# Patient Record
Sex: MALE | Race: WHITE | Employment: OTHER | URBAN - METROPOLITAN AREA
[De-identification: names, ages, dates, MRNs, and addresses within clinical notes are randomized per-mention and may not be internally consistent; named-entity substitution may affect disease eponyms.]

---

## 2018-10-17 ENCOUNTER — THERAPY VISIT (OUTPATIENT)
Dept: PHYSICAL THERAPY | Facility: CLINIC | Age: 63
End: 2018-10-17
Payer: COMMERCIAL

## 2018-10-17 DIAGNOSIS — M54.50 RIGHT-SIDED LOW BACK PAIN WITHOUT SCIATICA: Primary | ICD-10-CM

## 2018-10-17 DIAGNOSIS — M54.6 RIGHT-SIDED THORACIC BACK PAIN: ICD-10-CM

## 2018-10-17 PROCEDURE — G8979 MOBILITY GOAL STATUS: HCPCS | Mod: GP | Performed by: PHYSICAL THERAPIST

## 2018-10-17 PROCEDURE — 97161 PT EVAL LOW COMPLEX 20 MIN: CPT | Mod: GP | Performed by: PHYSICAL THERAPIST

## 2018-10-17 PROCEDURE — 97110 THERAPEUTIC EXERCISES: CPT | Mod: GP | Performed by: PHYSICAL THERAPIST

## 2018-10-17 PROCEDURE — G8978 MOBILITY CURRENT STATUS: HCPCS | Mod: GP | Performed by: PHYSICAL THERAPIST

## 2018-10-17 NOTE — MR AVS SNAPSHOT
After Visit Summary   10/17/2018    Eliel Mooney Jr.    MRN: [id number]           Patient Information     Date Of Birth          1955        Visit Information        Provider Department      10/17/2018 3:20 PM Love Sue, PT [facility]        Today's Diagnoses     Right-sided low back pain without sciatica    -  1    Right-sided thoracic back pain           Follow-ups after your visit        Your next 10 appointments already scheduled     Oct 24, 2018  3:20 PM CDT   [facility]   [facility] ([facility])    [address]  [place] [address]   [phone]            Oct 31, 2018  9:30 AM CDT   [facility]   [facility] ([facility]    [address]  [place] [address]   [phone]            Nov 07, 2018  9:30 AM CST   [facility]   [facility] ([facility])    [address]  [address]  [place] [address]   [phone]            Nov 14, 2018  9:30 AM CST   [facility]   [facility] ([facility]    [address]  [place] [address]   [phone]            Nov 19, 2018 10:00 AM CST   [facility]   [facility] ([facility]    [address]  [place] [address]   [phone]            Nov 28, 2018  9:30 AM CST   [facility]   [facility] ([facility] [place]    [address]  [address]  [place] [address]   [phone]              Who to contact     If you have questions or need follow up information about today's clinic visit or your schedule please contact [facility]  directly at [phone].  Normal or non-critical lab and imaging results will be communicated to you by MyChart, letter or phone within 4 business days after the clinic has received the results. If you do not hear from us within 7 days, please contact the clinic through MyChart or phone. If you have a critical or abnormal lab result, we will notify you by phone as soon as possible.  Submit refill requests through [employer] or call your pharmacy and they will forward the refill request to us. Please allow 3 business days for your refill to be completed.          Additional Information About Your Visit        Care EveryWhere ID     This is your Care EveryWhere ID. This could be used by other organizations to access your [place] medical records  [id number]         Blood Pressure from Last 3 Encounters:   03/08/13 136/82   12/05/12 142/88   06/04/12 118/72    Weight from Last 3 Encounters:   03/08/13 114.3 kg (252 lb)   12/05/12 114.8 kg (253 lb)   06/04/12 114.3 kg (252 lb)              We Performed the Following     [facility] Inital Eval Report     PT Eval, Low Complexity (86303)     Therapeutic Exercises        Primary Care Provider Office Phone # Fax #    [facility] [phone] [address] [place] [address]        Equal Access to Services     FAY JACKMAN : Hadii aad ku hadasho Soomaali, waaxda luqadaha, qaybta kaalmada adeegyada, reji rothman. So [facility] [phone].    ATENCIÓN: Si habla español, tiene a moore disposición servicios gratuitos de asistencia lingüística. Pkame al [phone].    We comply with applicable federal civil rights laws and [place] laws. We do not discriminate on the basis of race, color, national origin, age, disability, sex, sexual orientation, or gender identity.            Thank you!     Thank you for choosing [facility]  for your care. Our goal is always to provide you with  excellent care. Hearing back from our patients is one way we can continue to improve our services. Please take a few minutes to complete the written survey that you may receive in the mail after your visit with us. Thank you!             Your Updated Medication List - Protect others around you: Learn how to safely use, store and throw away your medicines at www.disposemymeds.org.          This list is accurate as of 10/17/18  4:35 PM.  Always use your most recent med list.                   Brand Name Dispense Instructions for use Diagnosis    ACE NOT PRESCRIBED (INTENTIONAL)           amitriptyline 10 MG tablet    ELAVIL    360 tablet    Take  by mouth At Bedtime. Four tablets at bedtime    Low back pain, Facet arthropathy, Idiopathic peripheral neuropathy, Stress, Physical deconditioning       aspirin 81 MG tablet      1 TABLET DAILY        blood glucose calibration solution     1 Bottle    1 Bottle by Test Solution route as needed.    DM type 2 (diabetes mellitus, type 2) (H)       blood glucose monitoring test strip    ONETOUCH ULTRA    180 strip    1 strip by Strip route 2 times daily.    DM type 2 (diabetes mellitus, type 2) (H)       Fenofibrate Micronized 134 MG Caps     90 capsule    Take 1 capsule by mouth daily. with food.    Hyperlipidemia LDL goal < 130       fish Oil 1200 MG capsule      daily        gabapentin 600 MG tablet    NEURONTIN    360 tablet    2 in the AM, 1 in the evening, 1 at Bedtime    Idiopathic peripheral neuropathy       * Ketoprofen Powd     180 g    3 times daily as needed. No more than a finger length full to tender areas    Low back pain, Facet arthropathy, Idiopathic peripheral neuropathy       * ketoprofen 10% in PLO 10% topical gel     100 g    Apply 1 Applicatorful topically every 4 hours as needed.    OA (osteoarthritis)       lidocaine 5 % Patch    LIDODERM    30 patch    Place 1 patch onto the skin every 24 hours.    Low back pain       losartan-hydrochlorothiazide 50-12.5  MG per tablet    HYZAAR    90 tablet    Take 1 tablet by mouth daily.    Hypertension goal BP (blood pressure) < 140/90       metFORMIN 500 MG tablet    GLUCOPHAGE    180 tablet    Take 1 tablet by mouth 2 times daily (with meals).    DM type 2 (diabetes mellitus, type 2) (H)       MULTI-DAY PO      daily        ONE TOUCH DELICA LANCETS Misc     100 each    1 Device 2 times daily.    DM type 2 (diabetes mellitus, type 2) (H)       * propranolol HCl 60 MG Cp24     90 capsule    Take 60 mg by mouth daily.    HTN (hypertension)       * propranolol 60 MG 24 hr capsule    INDERAL LA    90 capsule    Take 1 capsule by mouth daily.    HTN (hypertension)       simvastatin 20 MG tablet    ZOCOR    90 tablet    Take 1 tablet by mouth At Bedtime.    Hyperlipidemia LDL goal < 130       vitamin D 2000 units tablet      1 TABLET DAILY        VITAMIN-B COMPLEX PO      daily        * Notice:  This list has 4 medication(s) that are the same as other medications prescribed for you. Read the directions carefully, and ask your doctor or other care provider to review them with you.

## 2018-10-17 NOTE — PROGRESS NOTES
[facility] Initial Evaluation  Subjective:  Patient is a 63 year old male presenting with rehab back hpi. The history is provided by the patient. No  was used.   Eliel Mooney Jr. is a 63 year old male with a lumbar and thoracic condition.  Condition occurred with:  Insidious onset.  Condition occurred: for unknown reasons.  This is a new condition  Date of MD order for this episode was 10-15-18. Eliel has a hx of LBP(3 back surgeries, diabetic neuropathy, weakness B l/e's, foot drop L). He usually walks with a cane in the R hand. Since this exacerbation of pain has used 2 canes at home and needs help dressing. This exacerbation with unknown cause. Eliel states his back has felt pretty good since his last back surgery 2016.   Exercise-3x/wk, goes to gym-pool workouts, knee extension machine, stationary bike 10-15 min, upper body, wall squats, bridges-has been 3wks since been to the gym now. .    Patient reports pain:  Thoracic spine right and lumbar spine right.  Radiates to:  No radiation.  Pain is described as sharp, aching and stabbing and is constant and reported as 6/10.  Associated symptoms:  Loss of motion/stiffness. Pain is worse during the day.  Symptoms are exacerbated by bending, carrying, certain positions, lifting, walking, twisting and standing and relieved by ice, other and muscle relaxants (analgesic gel).  Since onset symptoms are unchanged.        General health as reported by patient is good.  Pertinent medical history includes:  Diabetes, high blood pressure, overweight and numbness/tingling.  Medical allergies: yes (niacin, lisinopril).  Other surgeries include:  Orthopedic surgery and other (backx3, gall bladder, L shoulder).  Current medications:  High blood pressure medication.  Current occupation is retired.    Primary job tasks include:  Other (yard work).    Barriers include:  None as reported by the patient.    Red flags:  Foot drop (has had  previous to this exacerbation of pain).                        Objective:  Standing Alignment:                  General deviations alignment: stands with trunk forward flexed.  Gait:  Foot drop L  Gait Type:  Antalgic   Assistive Devices:  Cane      Flexibility/Screens:       Lower Extremity:  Decreased left lower extremity flexibility:Piriformis; Quadriceps and Hamstrings    Decreased right lower extremity flexibility:  Piriformis; Quadriceps and Hamstrings               Lumbar/SI Evaluation  ROM:    AROM Lumbar:   Flexion:            25%  Ext:                    25%   Side Bend:        Left:  25%    Right:  25%  Rotation:           Left:  50%    Right:  50%  Side Glide:        Left:     Right:         Strength: able to activate TrA  Lumbar Myotomes:    T12-L3 (Hip Flex):  Left: 4    Right: 4  L2-4 (Quads):  Left:  5    Right:  4  L4 (Ankle DF):  Left:  0    Right:  3-  L5 (Great Toe Ext): Left: 0    Right: 3     Lumbar DTR's:  Lumbar dtr's: unable to elicit patellar B and L achilles, R achilles hypoactive.        Lumbar Dermtomes:  Lumbar dermatomes: sensation equal to light touch B l/e's.                Neural Tension/Mobility:      Left side:SLR  negative.     Right side:   SLR  negative.   Lumbar Palpation:      Tenderness not present at Left:    Piriformis or PSIS  Tenderness present at Right: Quadratus Lumborum and Erector Spinae  Tenderness not present at Right:  Piriformis or PSIS    Lumbar Provocation:      Left negative with:  PROM hip    Right negative with:  PROM hip  Spinal Segmental Conclusions: PA lumbar/lower thoracic spine stiff and not painful                                                       General     ROS    Assessment/Plan:    Patient is a 63 year old male with thoracic and lumbar complaints.    Patient has the following significant findings with corresponding treatment plan.                Diagnosis 1:  Thoracic lumbar pain R  Pain -  self management, education and home program  Decreased  ROM/flexibility - manual therapy, therapeutic exercise and home program  Decreased joint mobility - manual therapy, therapeutic exercise and home program  Decreased strength - therapeutic exercise, therapeutic activities and home program  Impaired balance - neuro re-education, therapeutic activities and home program  Decreased proprioception - neuro re-education, therapeutic activities and home program  Impaired gait - gait training and home program  Impaired muscle performance - neuro re-education and home program  Decreased function - therapeutic activities and home program  Impaired posture - neuro re-education and home program    Therapy Evaluation Codes:   1) History comprised of:   Personal factors that impact the plan of care:      Age and Past/current experiences.    Comorbidity factors that impact the plan of care are:      Diabetes and Overweight.     Medications impacting care: High blood pressure.  2) Examination of Body Systems comprised of:   Body structures and functions that impact the plan of care:      Lumbar spine and Thoracic Spine.   Activity limitations that impact the plan of care are:      Bathing, Bending, Cooking, Driving, Dressing, Lifting, Sitting, Squatting/kneeling, Stairs, Standing, Walking, Sleeping and Laying down.  3) Clinical presentation characteristics are:   Stable/Uncomplicated.  4) Decision-Making    Low complexity using standardized patient assessment instrument and/or measureable assessment of functional outcome.  Cumulative Therapy Evaluation is: Low complexity.    Previous and current functional limitations:  (See Goal Flow Sheet for this information)    Short term and Long term goals: (See Goal Flow Sheet for this information)     Communication ability:  Patient appears to be able to clearly communicate and understand verbal and written communication and follow directions correctly.  Treatment Explanation - The following has been discussed with the patient:   RX  ordered/plan of care  Anticipated outcomes  Possible risks and side effects  This patient would benefit from PT intervention to resume normal activities.   Rehab potential is good.    Frequency:  1 X week, once daily  Duration:  for 6 weeks  Discharge Plan:  Achieve all LTG.  Independent in home treatment program.  Reach maximal therapeutic benefit.    Please refer to the daily flowsheet for treatment today, total treatment time and time spent performing 1:1 timed codes.

## 2018-10-17 NOTE — LETTER
[facility] [place] [facility]  [address]  [address]  [place] [address]  [phone]    2018    Re: Eliel Mooney Jr.   :   1955  MRN:  [id number]   REFERRING PHYSICIAN:   Angela Rebolledo    [facility]    Date of Initial Evaluation: 10/17/18  Visits:  Rxs Used: 1  Reason for Referral:     Right-sided thoracic back pain  Right-sided low back pain without sciatica    EVALUATION SUMMARY    [facility] Initial Evaluation  Subjective:  Patient is a 63 year old male presenting with rehab back hpi. The history is provided by the patient. No  was used.   Eliel Mooney Jr. is a 63 year old male with a lumbar and thoracic condition.  Condition occurred with:  Insidious onset.  Condition occurred: for unknown reasons.  This is a new condition  Date of MD order for this episode was 10-15-18. Eliel has a hx of LBP(3 back surgeries, diabetic neuropathy, weakness B l/e's, foot drop L). He usually walks with a cane in the R hand. Since this exacerbation of pain has used 2 canes at home and needs help dressing. This exacerbation with unknown cause. Eliel states his back has felt pretty good since his last back surgery .   Exercise-3x/wk, goes to gym-pool workouts, knee extension machine, stationary bike 10-15 min, upper body, wall squats, bridges-has been 3wks since been to the gym now.   Patient reports pain:  Thoracic spine right and lumbar spine right.  Radiates to:  No radiation.  Pain is described as sharp, aching and stabbing and is constant and reported as 6/10.  Associated symptoms:  Loss of motion/stiffness. Pain is worse during the day.  Symptoms are exacerbated by bending, carrying, certain positions, lifting, walking, twisting and standing and relieved by ice, other and muscle relaxants (analgesic gel).  Since onset symptoms are unchanged.        General health as reported by patient is  good.  Pertinent medical history includes:  Diabetes, high blood pressure, overweight and numbness/tingling.  Medical allergies: yes (niacin, lisinopril).  Other surgeries include:  Orthopedic surgery and other (backx3, gall bladder, L shoulder).  Current medications:  High blood pressure medication.  Current occupation is retired.    Primary job tasks include:  Other (yard work).    Barriers include:  None as reported by the patient.  Red flags:  Foot drop (has had previous to this exacerbation of pain).    Objective:  Standing Alignment:        Eliel Mooney Jr.   :   1955            General deviations alignment: stands with trunk forward flexed.  Gait:  Foot drop L  Gait Type:  Antalgic   Assistive Devices:  Cane  Flexibility/Screens:   Lower Extremity:  Decreased left lower extremity flexibility:Piriformis; Quadriceps and Hamstrings  Decreased right lower extremity flexibility:  Piriformis; Quadriceps and Hamstrings       Lumbar/SI Evaluation  ROM:    AROM Lumbar:   Flexion:            25%  Ext:                    25%   Side Bend:        Left:  25%    Right:  25%  Rotation:           Left:  50%    Right:  50%  Side Glide:        Left:     Right:   Strength: able to activate TrA  Lumbar Myotomes:    T12-L3 (Hip Flex):  Left: 4    Right: 4  L2-4 (Quads):  Left:  5    Right:  4  L4 (Ankle DF):  Left:  0    Right:  3-  L5 (Great Toe Ext): Left: 0    Right: 3   Lumbar DTR's:  Lumbar dtr's: unable to elicit patellar B and L achilles, R achilles hypoactive.  Lumbar Dermtomes:  Lumbar dermatomes: sensation equal to light touch B l/e's.  Neural Tension/Mobility:    Left side:SLR  negative.   Right side:   SLR  negative.   Lumbar Palpation:    Tenderness not present at Left:    Piriformis or PSIS  Tenderness present at Right: Quadratus Lumborum and Erector Spinae  Tenderness not present at Right:  Piriformis or PSIS  Lumbar Provocation:    Left negative with:  PROM hip  Right negative with:  PROM hip  Spinal  Segmental Conclusions: PA lumbar/lower thoracic spine stiff and not painful      Assessment/Plan:    Patient is a 63 year old male with thoracic and lumbar complaints.    Patient has the following significant findings with corresponding treatment plan.                Diagnosis 1:  Thoracic lumbar pain R  Pain -  self management, education and home program  Decreased ROM/flexibility - manual therapy, therapeutic exercise and home program  Decreased joint mobility - manual therapy, therapeutic exercise and home program  Decreased strength - therapeutic exercise, therapeutic activities and home program  Impaired balance - neuro re-education, therapeutic activities and home program  Eliel Mooney Jr.   :   1955          Decreased proprioception - neuro re-education, therapeutic activities and home program  Impaired gait - gait training and home program  Impaired muscle performance - neuro re-education and home program  Decreased function - therapeutic activities and home program  Impaired posture - neuro re-education and home program    Previous and current functional limitations:  (See Goal Flow Sheet for this information)    Short term and Long term goals: (See Goal Flow Sheet for this information)     Communication ability:  Patient appears to be able to clearly communicate and understand verbal and written communication and follow directions correctly.  Treatment Explanation - The following has been discussed with the patient:   RX ordered/plan of care  Anticipated outcomes  Possible risks and side effects  This patient would benefit from PT intervention to resume normal activities.   Rehab potential is good.    Frequency:  1 X week, once daily  Duration:  for 6 weeks  Discharge Plan:  Achieve all LTG.  Independent in home treatment program.  Reach maximal therapeutic benefit.            Thank you for your referral.    INQUIRIES  Therapist:  Love Sue, PT  [facility]  [address]  [facility] [address]  Phone: [phone]  Fax: [phone]

## 2018-10-24 ENCOUNTER — THERAPY VISIT (OUTPATIENT)
Dept: PHYSICAL THERAPY | Facility: CLINIC | Age: 63
End: 2018-10-24
Payer: COMMERCIAL

## 2018-10-24 DIAGNOSIS — G89.29 CHRONIC RIGHT-SIDED LOW BACK PAIN WITHOUT SCIATICA: ICD-10-CM

## 2018-10-24 DIAGNOSIS — M54.6 CHRONIC RIGHT-SIDED THORACIC BACK PAIN: ICD-10-CM

## 2018-10-24 DIAGNOSIS — G89.29 CHRONIC RIGHT-SIDED THORACIC BACK PAIN: ICD-10-CM

## 2018-10-24 DIAGNOSIS — M54.50 CHRONIC RIGHT-SIDED LOW BACK PAIN WITHOUT SCIATICA: ICD-10-CM

## 2018-10-24 PROCEDURE — 97112 NEUROMUSCULAR REEDUCATION: CPT | Mod: GP | Performed by: PHYSICAL THERAPIST

## 2018-10-24 PROCEDURE — 97110 THERAPEUTIC EXERCISES: CPT | Mod: GP | Performed by: PHYSICAL THERAPIST

## 2018-10-31 ENCOUNTER — THERAPY VISIT (OUTPATIENT)
Dept: PHYSICAL THERAPY | Facility: CLINIC | Age: 63
End: 2018-10-31
Payer: COMMERCIAL

## 2018-10-31 DIAGNOSIS — M54.50 CHRONIC RIGHT-SIDED LOW BACK PAIN WITHOUT SCIATICA: ICD-10-CM

## 2018-10-31 DIAGNOSIS — M54.6 CHRONIC RIGHT-SIDED THORACIC BACK PAIN: ICD-10-CM

## 2018-10-31 DIAGNOSIS — G89.29 CHRONIC RIGHT-SIDED LOW BACK PAIN WITHOUT SCIATICA: ICD-10-CM

## 2018-10-31 DIAGNOSIS — G89.29 CHRONIC RIGHT-SIDED THORACIC BACK PAIN: ICD-10-CM

## 2018-10-31 PROCEDURE — 97110 THERAPEUTIC EXERCISES: CPT | Mod: GP | Performed by: PHYSICAL THERAPIST

## 2018-10-31 PROCEDURE — 97112 NEUROMUSCULAR REEDUCATION: CPT | Mod: GP | Performed by: PHYSICAL THERAPIST

## 2018-11-07 ENCOUNTER — THERAPY VISIT (OUTPATIENT)
Dept: PHYSICAL THERAPY | Facility: CLINIC | Age: 63
End: 2018-11-07
Payer: COMMERCIAL

## 2018-11-07 DIAGNOSIS — M54.50 CHRONIC RIGHT-SIDED LOW BACK PAIN WITHOUT SCIATICA: ICD-10-CM

## 2018-11-07 DIAGNOSIS — G89.29 CHRONIC RIGHT-SIDED LOW BACK PAIN WITHOUT SCIATICA: ICD-10-CM

## 2018-11-07 DIAGNOSIS — G89.29 CHRONIC RIGHT-SIDED THORACIC BACK PAIN: ICD-10-CM

## 2018-11-07 DIAGNOSIS — M54.6 CHRONIC RIGHT-SIDED THORACIC BACK PAIN: ICD-10-CM

## 2018-11-07 PROCEDURE — 97110 THERAPEUTIC EXERCISES: CPT | Mod: GP | Performed by: PHYSICAL THERAPIST

## 2018-11-07 PROCEDURE — 97112 NEUROMUSCULAR REEDUCATION: CPT | Mod: GP | Performed by: PHYSICAL THERAPIST

## 2018-11-28 ENCOUNTER — THERAPY VISIT (OUTPATIENT)
Dept: PHYSICAL THERAPY | Facility: CLINIC | Age: 63
End: 2018-11-28
Payer: COMMERCIAL

## 2018-11-28 DIAGNOSIS — M54.6 CHRONIC RIGHT-SIDED THORACIC BACK PAIN: ICD-10-CM

## 2018-11-28 DIAGNOSIS — G89.29 CHRONIC RIGHT-SIDED THORACIC BACK PAIN: ICD-10-CM

## 2018-11-28 DIAGNOSIS — G89.29 CHRONIC RIGHT-SIDED LOW BACK PAIN WITHOUT SCIATICA: ICD-10-CM

## 2018-11-28 DIAGNOSIS — M54.50 CHRONIC RIGHT-SIDED LOW BACK PAIN WITHOUT SCIATICA: ICD-10-CM

## 2018-11-28 PROCEDURE — 97110 THERAPEUTIC EXERCISES: CPT | Mod: GP | Performed by: PHYSICAL THERAPIST

## 2018-11-28 PROCEDURE — G8979 MOBILITY GOAL STATUS: HCPCS | Mod: GP | Performed by: PHYSICAL THERAPIST

## 2018-11-28 PROCEDURE — 97112 NEUROMUSCULAR REEDUCATION: CPT | Mod: GP | Performed by: PHYSICAL THERAPIST

## 2018-11-28 PROCEDURE — G8980 MOBILITY D/C STATUS: HCPCS | Mod: GP | Performed by: PHYSICAL THERAPIST

## 2018-11-28 NOTE — PROGRESS NOTES
"Subjective:  HPI  Oswestry Score: 34 %                 Objective:  System    Physical Exam    General     ROS    Assessment/Plan:    DISCHARGE REPORT    Progress reporting period is from 10/17/18 to 11/28/18.       SUBJECTIVE  Patient reports significant improvement since the beginning of therapy.  Patient notices most improvement with walking, as he had extreme difficulty ambulating into the clinic the first day and is now able to ambulate 1/4 - 1/2 mile.  Patient had pneumonia for 2 and a half weeks following last visit, so he wasn't exercising until walking in the pool on Monday which went well. Patient states \"pain in the back hasn't been there\" since last visit.  Current Pain level: 3/10.     Initial Pain level: 6/10.   Changes in function:  Yes, see subjective above  Adverse reaction to treatment or activity: None    OBJECTIVE  Objective:   Lumbar AROM: flexion mod loss; extension min loss; R/L SG nil loss with pinch in LB  Patient ambulates with SPC into clinic. Patient demonstrates a good understanding/control throughout abdominal exercises with small difficulties with higher level balance activities.      ASSESSMENT/PLAN  Updated problem list and treatment plan:   Diagnosis 1:  Low Back Pain - Home Program  STG/LTGs have been met or progress has been made towards goals:  Yes, see goal flow sheet attached.  Assessment of Progress: Patient is meeting short term goals and is progressing towards long term goals.  Self Management Plans:  Patient has been instructed in a home treatment program.  I have re-evaluated this patient and find that the nature, scope, duration and intensity of the therapy is appropriate for the medical condition of the patient.  Eliel continues to require the following intervention to meet STG and LTG's:  PT intervention is no longer required to meet STG/LTG.    Recommendations:  This patient is ready to be discharged from therapy and continue their home treatment program.  However, pt " will let us know if he has further issues.    Please refer to the daily flowsheet for treatment today, total treatment time and time spent performing 1:1 timed codes.

## 2018-11-28 NOTE — MR AVS SNAPSHOT
After Visit Summary   11/28/2018    Eliel Mooney Jr.    MRN: [id number]           Patient Information     Date Of Birth          1955        Visit Information        Provider Department      11/28/2018 9:30 AM Ubaldo Hi, [facility] [place] [facility] [place] [facility]        Today's Diagnoses     Chronic right-sided low back pain without sciatica        Chronic right-sided thoracic back pain           Follow-ups after your visit        Who to contact     If you have questions or need follow up information about today's clinic visit or your schedule please contact [facility] [place] [facility] directly at [phone].  Normal or non-critical lab and imaging results will be communicated to you by MyChart, letter or phone within 4 business days after the clinic has received the results. If you do not hear from us within 7 days, please contact the clinic through MyChart or phone. If you have a critical or abnormal lab result, we will notify you by phone as soon as possible.  Submit refill requests through [employer] or call your pharmacy and they will forward the refill request to us. Please allow 3 business days for your refill to be completed.          Additional Information About Your Visit        Care EveryWhere ID     This is your Care EveryWhere ID. This could be used by other organizations to access your [place] medical records  [id number]         Blood Pressure from Last 3 Encounters:   03/08/13 136/82   12/05/12 142/88   06/04/12 118/72    Weight from Last 3 Encounters:   03/08/13 114.3 kg (252 lb)   12/05/12 114.8 kg (253 lb)   06/04/12 114.3 kg (252 lb)              We Performed the Following     RAFIF Progress Notes Report     Neuromuscular Re-Education     Therapeutic Exercises        Primary Care Provider Office Phone # Fax #    [facility] [phone] [address]  [place] [address]        Equal Access to Services      FAY JACKMAN : Hadii aad ku karin Busby, waaxda luqadaha, qaybta kaalmada adecheo, reji josé manuel miguelangelmaritza mongejohnronal shaw . [facility] [phone].    ATENCIÓN: Si habla rajeev, tiene a moore disposición servicios gratuitos de asistencia lingüística. Llame al [phone].    We comply with applicable federal civil rights laws and [place] laws. We do not discriminate on the basis of race, color, national origin, age, disability, sex, sexual orientation, or gender identity.            Thank you!     Thank you for choosing [facility]  for your care. Our goal is always to provide you with excellent care. Hearing back from our patients is one way we can continue to improve our services. Please take a few minutes to complete the written survey that you may receive in the mail after your visit with us. Thank you!             Your Updated Medication List - Protect others around you: Learn how to safely use, store and throw away your medicines at www.disposemymeds.org.          This list is accurate as of 11/28/18 12:58 PM.  Always use your most recent med list.                   Brand Name Dispense Instructions for use Diagnosis    ACE NOT PRESCRIBED (INTENTIONAL)           amitriptyline 10 MG tablet    ELAVIL    360 tablet    Take  by mouth At Bedtime. Four tablets at bedtime    Low back pain, Facet arthropathy, Idiopathic peripheral neuropathy, Stress, Physical deconditioning       aspirin 81 MG tablet    ASA     1 TABLET DAILY        blood glucose calibration solution     1 Bottle    1 Bottle by Test Solution route as needed.    DM type 2 (diabetes mellitus, type 2) (H)       blood glucose monitoring test strip    ONETOUCH ULTRA    180 strip    1 strip by Strip route 2 times daily.    DM type 2 (diabetes mellitus, type 2) (H)       Fenofibrate Micronized 134 MG Caps     90 capsule    Take 1 capsule by mouth daily. with food.    Hyperlipidemia LDL goal < 130       fish Oil 1200 MG  capsule      daily        gabapentin 600 MG tablet    NEURONTIN    360 tablet    2 in the AM, 1 in the evening, 1 at Bedtime    Idiopathic peripheral neuropathy       * Ketoprofen Powd     180 g    3 times daily as needed. No more than a finger length full to tender areas    Low back pain, Facet arthropathy, Idiopathic peripheral neuropathy       * ketoprofen 10% in PLO 10% topical gel     100 g    Apply 1 Applicatorful topically every 4 hours as needed.    OA (osteoarthritis)       lidocaine 5 % patch    LIDODERM    30 patch    Place 1 patch onto the skin every 24 hours.    Low back pain       losartan-hydrochlorothiazide 50-12.5 MG per tablet    HYZAAR    90 tablet    Take 1 tablet by mouth daily.    Hypertension goal BP (blood pressure) < 140/90       metFORMIN 500 MG tablet    GLUCOPHAGE    180 tablet    Take 1 tablet by mouth 2 times daily (with meals).    DM type 2 (diabetes mellitus, type 2) (H)       MULTI-DAY PO      daily        ONE TOUCH DELICA LANCETS Misc     100 each    1 Device 2 times daily.    DM type 2 (diabetes mellitus, type 2) (H)       * propranolol HCl 60 MG Cp24     90 capsule    Take 60 mg by mouth daily.    HTN (hypertension)       * propranolol 60 MG 24 hr capsule    INDERAL LA    90 capsule    Take 1 capsule by mouth daily.    HTN (hypertension)       simvastatin 20 MG tablet    ZOCOR    90 tablet    Take 1 tablet by mouth At Bedtime.    Hyperlipidemia LDL goal < 130       vitamin D3 2000 units tablet    CHOLECALCIFEROL     1 TABLET DAILY        VITAMIN-B COMPLEX PO      daily        * Notice:  This list has 4 medication(s) that are the same as other medications prescribed for you. Read the directions carefully, and ask your doctor or other care provider to review them with you.

## 2018-11-28 NOTE — LETTER
"[place] [facility] [place] [facility]  [address]  [address]  [place] [address]  [phone]    2018    Re: Eliel Mooney Jr.   :   1955  MRN:  [id number]   REFERRING PHYSICIAN:   Angela Rebolledo    [place] [facility]    Date of Initial Evaluation: 10/17/18  Visits:  Rxs Used: 5  Reason for Referral:     Chronic right-sided low back pain without sciatica  Chronic right-sided thoracic back pain    DISCHARGE REPORT    Progress reporting period is from 10/17/18 to 18.       SUBJECTIVE  Patient reports significant improvement since the beginning of therapy.  Patient notices most improvement with walking, as he had extreme difficulty ambulating into the clinic the first day and is now able to ambulate 1/4 - 1/2 mile.  Patient had pneumonia for 2 and a half weeks following last visit, so he wasn't exercising until walking in the pool on Monday which went well. Patient states \"pain in the back hasn't been there\" since last visit.  Current Pain level: 3/10.     Initial Pain level: 6/10.   Changes in function:  Yes, see subjective above  Adverse reaction to treatment or activity: None    OBJECTIVE  Objective:   Lumbar AROM: flexion mod loss; extension min loss; R/L SG nil loss with pinch in LB  Patient ambulates with SPC into clinic. Patient demonstrates a good understanding/control throughout abdominal exercises with small difficulties with higher level balance activities.      ASSESSMENT/PLAN  Updated problem list and treatment plan:   Diagnosis 1:  Low Back Pain - Home Program  STG/LTGs have been met or progress has been made towards goals:  Yes, see goal flow sheet attached.  Assessment of Progress: Patient is meeting short term goals and is progressing towards long term goals.  Self Management Plans:  Patient has been instructed in a home treatment program.  I have re-evaluated this patient and find that the nature, scope, duration and intensity " of the therapy is appropriate for the medical condition of the patient.  Eliel continues to require the following intervention to meet STG and LTG's:  PT intervention is no longer required to meet STG/LTG.    Recommendations:  This patient is ready to be discharged from therapy and continue their home treatment program.  However, pt will let us know if he has further issues.                  Eliel Melojessica Chin.   :   1955          Thank you for your referral.    INQUIRIES  Therapist: Ubaldo Hi, PT, ATC, [facility]  [facility] [place] [facility]  [address]  [place] [address]  Phone: [phone]  Fax: [phone]

## 2022-08-31 ENCOUNTER — TRANSFERRED RECORDS (OUTPATIENT)
Dept: HEALTH INFORMATION MANAGEMENT | Facility: CLINIC | Age: 67
End: 2022-08-31

## 2023-02-06 ENCOUNTER — APPOINTMENT (OUTPATIENT)
Dept: URBAN - METROPOLITAN AREA CLINIC 252 | Age: 68
Setting detail: DERMATOLOGY
End: 2023-02-13

## 2023-02-06 VITALS — WEIGHT: 235 LBS | HEIGHT: 71 IN

## 2023-02-06 DIAGNOSIS — L57.0 ACTINIC KERATOSIS: ICD-10-CM

## 2023-02-06 DIAGNOSIS — D18.0 HEMANGIOMA: ICD-10-CM

## 2023-02-06 DIAGNOSIS — F42.4 EXCORIATION (SKIN-PICKING) DISORDER: ICD-10-CM

## 2023-02-06 DIAGNOSIS — Z71.89 OTHER SPECIFIED COUNSELING: ICD-10-CM

## 2023-02-06 DIAGNOSIS — D49.2 NEOPLASM OF UNSPECIFIED BEHAVIOR OF BONE, SOFT TISSUE, AND SKIN: ICD-10-CM

## 2023-02-06 DIAGNOSIS — L57.8 OTHER SKIN CHANGES DUE TO CHRONIC EXPOSURE TO NONIONIZING RADIATION: ICD-10-CM

## 2023-02-06 DIAGNOSIS — L90.5 SCAR CONDITIONS AND FIBROSIS OF SKIN: ICD-10-CM

## 2023-02-06 PROBLEM — D23.71 OTHER BENIGN NEOPLASM OF SKIN OF RIGHT LOWER LIMB, INCLUDING HIP: Status: ACTIVE | Noted: 2023-02-06

## 2023-02-06 PROBLEM — D18.01 HEMANGIOMA OF SKIN AND SUBCUTANEOUS TISSUE: Status: ACTIVE | Noted: 2023-02-06

## 2023-02-06 PROCEDURE — 99202 OFFICE O/P NEW SF 15 MIN: CPT | Mod: 25

## 2023-02-06 PROCEDURE — OTHER MIPS QUALITY: OTHER

## 2023-02-06 PROCEDURE — 11102 TANGNTL BX SKIN SINGLE LES: CPT

## 2023-02-06 PROCEDURE — OTHER COUNSELING: OTHER

## 2023-02-06 PROCEDURE — 17003 DESTRUCT PREMALG LES 2-14: CPT

## 2023-02-06 PROCEDURE — 17000 DESTRUCT PREMALG LESION: CPT | Mod: 59

## 2023-02-06 PROCEDURE — OTHER LIQUID NITROGEN: OTHER

## 2023-02-06 PROCEDURE — OTHER BIOPSY BY SHAVE METHOD: OTHER

## 2023-02-06 ASSESSMENT — LOCATION DETAILED DESCRIPTION DERM
LOCATION DETAILED: LEFT RADIAL DORSAL HAND
LOCATION DETAILED: LEFT KNEE
LOCATION DETAILED: STERNUM
LOCATION DETAILED: RIGHT SUPERIOR POSTERIOR NECK
LOCATION DETAILED: RIGHT SUPERIOR LATERAL MALAR CHEEK
LOCATION DETAILED: LEFT LATERAL EYEBROW
LOCATION DETAILED: EPIGASTRIC SKIN
LOCATION DETAILED: PERIUMBILICAL SKIN

## 2023-02-06 ASSESSMENT — LOCATION SIMPLE DESCRIPTION DERM
LOCATION SIMPLE: RIGHT CHEEK
LOCATION SIMPLE: ABDOMEN
LOCATION SIMPLE: LEFT EYEBROW
LOCATION SIMPLE: CHEST
LOCATION SIMPLE: POSTERIOR NECK
LOCATION SIMPLE: LEFT KNEE
LOCATION SIMPLE: LEFT HAND

## 2023-02-06 ASSESSMENT — LOCATION ZONE DERM
LOCATION ZONE: LEG
LOCATION ZONE: HAND
LOCATION ZONE: FACE
LOCATION ZONE: NECK
LOCATION ZONE: TRUNK

## 2023-02-06 NOTE — HPI: FULL BODY SKIN EXAMINATION
How Severe Are Your Spot(S)?: mild
What Type Of Note Output Would You Prefer (Optional)?: Bullet Format
What Is The Reason For Today's Visit?: Full Body Skin Examination
What Is The Reason For Today's Visit? (Being Monitored For X): the development of new lesions
Additional History: Dr. Engel was primary care

## 2023-02-06 NOTE — PROCEDURE: BIOPSY BY SHAVE METHOD
Notification Instructions: - It can take up to 2 weeks to get a biopsy result. \\n- Please refrain from calling to request results until after 2 weeks.
Validate That Anesthesia Is Not 0: No
Additional Anesthesia Volume In Cc (Will Not Render If 0): 0
Hide Topical Anesthesia?: Yes
Anesthesia Type: 2% lidocaine with epinephrine
Electrodesiccation Text: The wound bed was treated with electrodesiccation after the biopsy was performed.
Cryotherapy Text: The wound bed was treated with cryotherapy after the biopsy was performed.
Post-Care Instructions: WOUND CARE:\\nDo NOT submerge wound in a bath, swimming pool, or hot tub for at least 1 week or for as long as there is an open wound. Gently cleanse the site daily with mild soap and water. Be careful NOT to allow a forceful stream of water to hit the biopsy site. After cleaning/showering, apply a thin layer of petrolatum ointment or Aquaphor in the wound followed by an adhesive bandage. Continue this process daily until healed. \\n\\nBLEEDING:\\nIf you develop persistent bleeding, apply firm and steady pressure over the dressing with gauze for 15 minutes. If bleeding persists, reapply pressure with an ice pack over the gauze for 15 minutes. NEVER APPLY ICE DIRECTLY TO THE SKIN. Do NOT peek under the gauze during these 15 minutes of pressure.  Call our office at [phone] if you cannot get the bleeding to stop. \\n\\nINFECTION:\\nSigns of infection may include increasing rather than decreasing pain (after the first few days), increasing redness/swelling/heat, draining pus, pink/red streaks around the wound, and fever or chills.  Please call our office immediately at [phone] if infection is suspected. It is normal to have some mild redness on or around the wound edges; this will lighten day by day and will become less tender.\\n\\nPAIN:\\nPain is usually minimal, but if needed you may take acetaminophen (Tylenol) every four hours as needed. Applying an ice pack over the dressing for 15-20 minutes every 2-3 hours will relieve swelling, lessen pain, and help minimize bruising. NEVER APPLY ICE DIRECTLY TO THE SKIN. Avoid ibuprofen (Advil, Motrin) and naproxen (Aleve) for the first 48 hours as these can increase bleeding.\\n\\nSWELLIG AND BRUISING:\\nSwelling and bruising are common and temporary, usually lasting 1 - 2 weeks. It is more common in areas treated around the eyes, hands, and feet. In the days following the procedure, swelling and bruising can be minimized by keeping the affected areas elevated when possible, reducing salty foods, and applying ice packs over the dressing for 15-20 minutes every 2-3 hours. NEVER APPLY ICE DIRECTLY TO THE SKIN.\\n\\nITCHING:\\nItchiness on and around the wound is very common and can last several days to weeks after surgery. Mild itch is normal as the wound is healing. \\n\\nNERVE CHANGES:\\nNumbness is usually temporary, but it may last for several weeks to months. You may also experience sharp pains at the wound sight as it heals.  Mild pain is normal and will gradually improve with time.\\n \\nNO SMOKING:\\nSmoking will delay the healing process. If you smoke, we recommend trying to quit or at minimum reduce how much you smoke following a procedure.
Dressing: bandage
Silver Nitrate Text: The wound bed was treated with silver nitrate after the biopsy was performed.
Type Of Destruction Used: Electrodesiccation
Anesthesia Volume In Cc (Will Not Render If 0): 0.5
Billing Type: Third-Party Bill
Information: Selecting Yes will display possible errors in your note based on the variables you have selected. This validation is only offered as a suggestion for you. PLEASE NOTE THAT THE VALIDATION TEXT WILL BE REMOVED WHEN YOU FINALIZE YOUR NOTE. IF YOU WANT TO FAX A PRELIMINARY NOTE YOU WILL NEED TO TOGGLE THIS TO 'NO' IF YOU DO NOT WANT IT IN YOUR FAXED NOTE.
Detail Level: Detailed
Curettage Text: The wound bed was treated with curettage after the biopsy was performed.
Electrodesiccation And Curettage Text: The wound bed was treated with electrodesiccation and curettage after the biopsy was performed.
Consent: - Verbal and written consent was obtained and risks were reviewed prior to procedure today. \\n- Risks discussed include but are not limited to scarring, infection, bleeding, scabbing, incomplete removal, nerve damage, pain, and allergy to anesthesia.
Path Notes Override (Will Replace All Of The Above Text): NROD
Biopsy Type: H and E
Depth Of Biopsy: dermis
Hemostasis: Aluminum Chloride
Wound Care: Petrolatum
Biopsy Method: Dermablade

## 2023-02-06 NOTE — PROCEDURE: COUNSELING
Detail Level: Detailed
Detail Level: Zone
Detail Level: Simple
Patient Specific Counseling (Will Not Stick From Patient To Patient): - Discussed that this nevus has atypical features that warrant a biopsy.\\n- Patient expressed understanding.\\n- Follow-up for re-examination for regimentation or an additional removal procedure may become necessary depending the pathologist's report.

## 2023-02-06 NOTE — PROCEDURE: LIQUID NITROGEN
Detail Level: Detailed
Show Aperture Variable?: Yes
Post-Care Instructions: - Patient was instructed to avoid picking at any of the treated lesions.
Application Tool (Optional): Liquid Nitrogen Sprayer
Consent: - Discussed that these are a result of cumulative sun exposure.\\n- Verbal and written consent was obtained, and risks were reviewed prior to procedure today. \\n- Risks discussed include but are not limited to pain, crusting, scabbing, blistering, scarring, temporary or permanent darker or lighter pigmentary change, recurrence, incomplete resolution, and infection.

## 2023-03-15 ENCOUNTER — APPOINTMENT (OUTPATIENT)
Dept: URBAN - METROPOLITAN AREA CLINIC 259 | Age: 68
Setting detail: DERMATOLOGY
End: 2023-03-27

## 2023-03-15 PROBLEM — D03.4 MELANOMA IN SITU OF SCALP AND NECK: Status: ACTIVE | Noted: 2023-03-15

## 2023-03-15 PROCEDURE — OTHER MOHS SURGERY WITH DEBULK SPECIMEN: OTHER

## 2023-03-15 PROCEDURE — OTHER MIPS QUALITY: OTHER

## 2023-03-15 PROCEDURE — 17311 MOHS 1 STAGE H/N/HF/G: CPT

## 2023-03-15 PROCEDURE — 13132 CMPLX RPR F/C/C/M/N/AX/G/H/F: CPT

## 2023-03-15 NOTE — PROCEDURE: MIPS QUALITY
Quality 265: Biopsy Follow-Up: Biopsy results reviewed, communicated, tracked, and documented
Quality 137: Melanoma: Continuity Of Care - Recall System: Patient information entered into a recall system that includes: target date for the next exam specified AND a process to follow up with patients regarding missed or unscheduled appointments
Quality 143: Oncology: Medical And Radiation- Pain Intensity Quantified: Pain severity quantified, no pain present
Detail Level: Detailed
Quality 431: Preventive Care And Screening: Unhealthy Alcohol Use - Screening: Patient not identified as an unhealthy alcohol user when screened for unhealthy alcohol use using a systematic screening method
Quality 226: Preventive Care And Screening: Tobacco Use: Screening And Cessation Intervention: Patient screened for tobacco use and is an ex/non-smoker
Quality 111:Pneumonia Vaccination Status For Older Adults: Pneumococcal vaccine (PPSV23) administered on or after patient’s 60th birthday and before the end of the measurement period
Quality 110: Preventive Care And Screening: Influenza Immunization: Influenza Immunization previously received during influenza season
Quality 138: Melanoma: Coordination Of Care: A treatment plan was communicated to the physicians providing continuing care within one month of diagnosis outlining: diagnosis, tumor thickness and a plan for surgery or alternate care.
Quality 397: Melanoma: Reporting: Pathology report includes the pT Category, thickness, ulceration and mitotic rate, peripheral and deep margin status and presence or absence of microsatellitosis for invasive tumors.
Quality 130: Documentation Of Current Medications In The Medical Record: Current Medications Documented

## 2023-03-30 ENCOUNTER — APPOINTMENT (OUTPATIENT)
Dept: URBAN - METROPOLITAN AREA CLINIC 252 | Age: 68
Setting detail: DERMATOLOGY
End: 2023-03-30

## 2023-03-30 DIAGNOSIS — Z48.02 ENCOUNTER FOR REMOVAL OF SUTURES: ICD-10-CM

## 2023-03-30 PROCEDURE — OTHER COUNSELING: OTHER

## 2023-03-30 PROCEDURE — 99024 POSTOP FOLLOW-UP VISIT: CPT

## 2023-03-30 PROCEDURE — OTHER SUTURE REMOVAL (GLOBAL PERIOD): OTHER

## 2023-03-30 ASSESSMENT — LOCATION SIMPLE DESCRIPTION DERM: LOCATION SIMPLE: POSTERIOR NECK

## 2023-03-30 ASSESSMENT — LOCATION DETAILED DESCRIPTION DERM: LOCATION DETAILED: RIGHT POSTERIOR NECK

## 2023-03-30 ASSESSMENT — LOCATION ZONE DERM: LOCATION ZONE: NECK

## 2023-03-30 NOTE — PROCEDURE: COUNSELING
Detail Level: Detailed
Patient Specific Counseling (Will Not Stick From Patient To Patient): *** Steri strips placed after suture removal. Pt instructed to continue wound care as direct by WDT after strips fall off.

## 2023-03-30 NOTE — PROCEDURE: SUTURE REMOVAL (GLOBAL PERIOD)
Detail Level: Detailed
Add 77812 Cpt? (Important Note: In 2017 The Use Of 05794 Is Being Tracked By Cms To Determine Future Global Period Reimbursement For Global Periods): yes

## 2024-08-13 NOTE — PROCEDURE: MOHS SURGERY WITH DEBULK SPECIMEN
The ECG revealed a sinus rhythm. The ECG rate was 74 bpm. Nasalis-Muscle-Based Myocutaneous Island Pedicle Flap Text: Using a #15 blade, an incision was made around the donor flap to the level of the nasalis muscle. Wide lateral undermining was then performed in both the subcutaneous plane above the nasalis muscle, and in a submuscular plane just above periosteum. This allowed the formation of a free nasalis muscle axial pedicle (based on the angular artery) which was still attached to the actual cutaneous flap, increasing its mobility and vascular viability. Hemostasis was obtained with pinpoint electrocoagulation. The flap was mobilized into position and the pivotal anchor points positioned and stabilized with buried interrupted sutures. Subcutaneous and dermal tissues were closed in a multilayered fashion with sutures. Tissue redundancies were excised, and the epidermal edges were apposed without significant tension and sutured with sutures.

## 2025-05-19 ENCOUNTER — APPOINTMENT (OUTPATIENT)
Dept: URBAN - METROPOLITAN AREA CLINIC 252 | Age: 70
Setting detail: DERMATOLOGY
End: 2025-05-19

## 2025-05-19 VITALS — HEIGHT: 71 IN | WEIGHT: 240 LBS

## 2025-05-19 DIAGNOSIS — Z86.006 PERSONAL HISTORY OF MELANOMA IN-SITU: ICD-10-CM

## 2025-05-19 DIAGNOSIS — L81.4 OTHER MELANIN HYPERPIGMENTATION: ICD-10-CM

## 2025-05-19 DIAGNOSIS — D49.2 NEOPLASM OF UNSPECIFIED BEHAVIOR OF BONE, SOFT TISSUE, AND SKIN: ICD-10-CM

## 2025-05-19 DIAGNOSIS — T07XXXA INSECT BITE, NONVENOMOUS, OF OTHER, MULTIPLE, AND UNSPECIFIED SITES, WITHOUT MENTION OF INFECTION: ICD-10-CM

## 2025-05-19 DIAGNOSIS — L82.1 OTHER SEBORRHEIC KERATOSIS: ICD-10-CM

## 2025-05-19 DIAGNOSIS — D22 MELANOCYTIC NEVI: ICD-10-CM

## 2025-05-19 DIAGNOSIS — L57.0 ACTINIC KERATOSIS: ICD-10-CM

## 2025-05-19 DIAGNOSIS — Z71.89 OTHER SPECIFIED COUNSELING: ICD-10-CM

## 2025-05-19 PROBLEM — D22.5 MELANOCYTIC NEVI OF TRUNK: Status: ACTIVE | Noted: 2025-05-19

## 2025-05-19 PROBLEM — D23.71 OTHER BENIGN NEOPLASM OF SKIN OF RIGHT LOWER LIMB, INCLUDING HIP: Status: ACTIVE | Noted: 2025-05-19

## 2025-05-19 PROBLEM — S20.369A INSECT BITE (NONVENOMOUS) OF UNSPECIFIED FRONT WALL OF THORAX, INITIAL ENCOUNTER: Status: ACTIVE | Noted: 2025-05-19

## 2025-05-19 PROBLEM — D23.72 OTHER BENIGN NEOPLASM OF SKIN OF LEFT LOWER LIMB, INCLUDING HIP: Status: ACTIVE | Noted: 2025-05-19

## 2025-05-19 PROCEDURE — 17000 DESTRUCT PREMALG LESION: CPT | Mod: 59

## 2025-05-19 PROCEDURE — 99213 OFFICE O/P EST LOW 20 MIN: CPT | Mod: 25

## 2025-05-19 PROCEDURE — OTHER LIQUID NITROGEN: OTHER

## 2025-05-19 PROCEDURE — OTHER COUNSELING: OTHER

## 2025-05-19 PROCEDURE — OTHER BIOPSY BY SHAVE METHOD: OTHER

## 2025-05-19 PROCEDURE — 17003 DESTRUCT PREMALG LES 2-14: CPT

## 2025-05-19 PROCEDURE — 11102 TANGNTL BX SKIN SINGLE LES: CPT

## 2025-05-19 ASSESSMENT — LOCATION DETAILED DESCRIPTION DERM
LOCATION DETAILED: RIGHT CENTRAL ZYGOMA
LOCATION DETAILED: LEFT SUPERIOR HELIX
LOCATION DETAILED: LEFT PROXIMAL DORSAL FOREARM
LOCATION DETAILED: RIGHT PROXIMAL DORSAL FOREARM
LOCATION DETAILED: RIGHT POSTERIOR SHOULDER
LOCATION DETAILED: RIGHT MEDIAL UPPER BACK
LOCATION DETAILED: RIGHT DISTAL POSTERIOR UPPER ARM
LOCATION DETAILED: LEFT SUPERIOR CENTRAL MALAR CHEEK
LOCATION DETAILED: RIGHT SUPERIOR POSTERIOR NECK
LOCATION DETAILED: LEFT POSTERIOR SHOULDER
LOCATION DETAILED: RIGHT SUPERIOR MEDIAL UPPER BACK
LOCATION DETAILED: LEFT ULNAR DORSAL HAND
LOCATION DETAILED: LEFT MEDIAL INFERIOR CHEST

## 2025-05-19 ASSESSMENT — LOCATION ZONE DERM
LOCATION ZONE: FACE
LOCATION ZONE: TRUNK
LOCATION ZONE: NECK
LOCATION ZONE: ARM
LOCATION ZONE: EAR
LOCATION ZONE: HAND

## 2025-05-19 ASSESSMENT — LOCATION SIMPLE DESCRIPTION DERM
LOCATION SIMPLE: POSTERIOR NECK
LOCATION SIMPLE: LEFT HAND
LOCATION SIMPLE: RIGHT UPPER ARM
LOCATION SIMPLE: RIGHT ZYGOMA
LOCATION SIMPLE: RIGHT FOREARM
LOCATION SIMPLE: LEFT FOREARM
LOCATION SIMPLE: LEFT SHOULDER
LOCATION SIMPLE: LEFT EAR
LOCATION SIMPLE: RIGHT UPPER BACK
LOCATION SIMPLE: LEFT CHEEK
LOCATION SIMPLE: RIGHT SHOULDER
LOCATION SIMPLE: CHEST

## 2025-06-12 ENCOUNTER — APPOINTMENT (OUTPATIENT)
Dept: URBAN - METROPOLITAN AREA CLINIC 252 | Age: 70
Setting detail: DERMATOLOGY
End: 2025-06-12

## 2025-06-12 VITALS — HEIGHT: 71 IN | WEIGHT: 235 LBS

## 2025-06-12 PROBLEM — D03.61 MELANOMA IN SITU OF RIGHT UPPER LIMB, INCLUDING SHOULDER: Status: ACTIVE | Noted: 2025-06-12

## 2025-06-12 PROCEDURE — OTHER EXCISION: OTHER

## 2025-06-12 PROCEDURE — 11603 EXC TR-EXT MAL+MARG 2.1-3 CM: CPT

## 2025-06-12 PROCEDURE — OTHER MIPS QUALITY: OTHER

## 2025-06-12 PROCEDURE — 12031 INTMD RPR S/A/T/EXT 2.5 CM/<: CPT

## 2025-06-12 PROCEDURE — OTHER COUNSELING: OTHER

## 2025-06-23 ENCOUNTER — APPOINTMENT (OUTPATIENT)
Dept: URBAN - METROPOLITAN AREA CLINIC 252 | Age: 70
Setting detail: DERMATOLOGY
End: 2025-06-30

## 2025-06-23 DIAGNOSIS — Z48.02 ENCOUNTER FOR REMOVAL OF SUTURES: ICD-10-CM

## 2025-06-23 PROCEDURE — OTHER SUTURE REMOVAL (GLOBAL PERIOD): OTHER

## 2025-06-23 PROCEDURE — OTHER COUNSELING: OTHER

## 2025-06-23 PROCEDURE — 99024 POSTOP FOLLOW-UP VISIT: CPT

## 2025-06-23 ASSESSMENT — LOCATION DETAILED DESCRIPTION DERM: LOCATION DETAILED: RIGHT PROXIMAL POSTERIOR UPPER ARM

## 2025-06-23 ASSESSMENT — LOCATION ZONE DERM: LOCATION ZONE: ARM

## 2025-06-23 ASSESSMENT — LOCATION SIMPLE DESCRIPTION DERM: LOCATION SIMPLE: RIGHT UPPER ARM
